# Patient Record
Sex: FEMALE | Race: BLACK OR AFRICAN AMERICAN
[De-identification: names, ages, dates, MRNs, and addresses within clinical notes are randomized per-mention and may not be internally consistent; named-entity substitution may affect disease eponyms.]

---

## 2023-01-14 ENCOUNTER — HOSPITAL ENCOUNTER (INPATIENT)
Dept: HOSPITAL 12 - ER | Age: 43
LOS: 2 days | Discharge: HOME | DRG: 760 | End: 2023-01-16
Payer: COMMERCIAL

## 2023-01-14 VITALS — WEIGHT: 185 LBS | BODY MASS INDEX: 31.58 KG/M2 | HEIGHT: 64 IN

## 2023-01-14 DIAGNOSIS — I10: ICD-10-CM

## 2023-01-14 DIAGNOSIS — Z20.822: ICD-10-CM

## 2023-01-14 DIAGNOSIS — D62: ICD-10-CM

## 2023-01-14 DIAGNOSIS — N93.9: ICD-10-CM

## 2023-01-14 DIAGNOSIS — E83.42: ICD-10-CM

## 2023-01-14 DIAGNOSIS — Z98.51: ICD-10-CM

## 2023-01-14 DIAGNOSIS — E88.09: ICD-10-CM

## 2023-01-14 DIAGNOSIS — E44.0: ICD-10-CM

## 2023-01-14 DIAGNOSIS — D50.9: ICD-10-CM

## 2023-01-14 DIAGNOSIS — D25.9: Primary | ICD-10-CM

## 2023-01-14 DIAGNOSIS — E87.6: ICD-10-CM

## 2023-01-14 DIAGNOSIS — E03.9: ICD-10-CM

## 2023-01-14 LAB
APPEARANCE UR: CLEAR
BILIRUB UR QL STRIP: NEGATIVE
BUN SERPL-MCNC: 7 MG/DL (ref 7–18)
CHLORIDE SERPL-SCNC: 104 MMOL/L (ref 98–107)
CO2 SERPL-SCNC: 29 MMOL/L (ref 21–32)
COLOR UR: YELLOW
CREAT SERPL-MCNC: 0.8 MG/DL (ref 0.6–1.3)
DEPRECATED SQUAMOUS URNS QL MICRO: (no result) /HPF
EOSINOPHIL NFR BLD MANUAL: 3 % (ref 0–8)
GLUCOSE SERPL-MCNC: 100 MG/DL (ref 74–106)
GLUCOSE UR STRIP-MCNC: NEGATIVE MG/DL
HCG UR QL: (no result)
HCT VFR BLD AUTO: 20.9 % (ref 31.2–41.9)
HGB UR QL STRIP: (no result)
KETONES UR STRIP-MCNC: NEGATIVE MG/DL
LEUKOCYTE ESTERASE UR QL STRIP: NEGATIVE
LYMPHOCYTES NFR BLD MANUAL: 23 % (ref 20–40)
MCH RBC QN AUTO: 22.6 UUG (ref 24.7–32.8)
MCV RBC AUTO: 70.7 FL (ref 75.5–95.3)
MONOCYTES NFR BLD MANUAL: 7 % (ref 2–10)
NEUTS SEG NFR BLD MANUAL: 67 % (ref 42–75)
NITRITE UR QL STRIP: NEGATIVE
PH UR STRIP: 7 [PH] (ref 5–8)
PLATELET # BLD AUTO: 355 K/UL (ref 179–408)
POTASSIUM SERPL-SCNC: 2.8 MMOL/L (ref 3.5–5.1)
RBC #/AREA URNS HPF: (no result) /HPF (ref 0–3)
SP GR UR STRIP: 1.01 (ref 1–1.03)
UROBILINOGEN UR STRIP-MCNC: 0.2 E.U./DL
WBC #/AREA URNS HPF: (no result) /HPF
WBC #/AREA URNS HPF: (no result) /HPF (ref 0–3)

## 2023-01-14 PROCEDURE — 30233N1 TRANSFUSION OF NONAUTOLOGOUS RED BLOOD CELLS INTO PERIPHERAL VEIN, PERCUTANEOUS APPROACH: ICD-10-PCS

## 2023-01-14 PROCEDURE — P9016 RBC LEUKOCYTES REDUCED: HCPCS

## 2023-01-14 PROCEDURE — G0378 HOSPITAL OBSERVATION PER HR: HCPCS

## 2023-01-14 PROCEDURE — A4663 DIALYSIS BLOOD PRESSURE CUFF: HCPCS

## 2023-01-14 RX ADMIN — VITAMIN D, TAB 1000IU (100/BT) SCH UNIT: 25 TAB at 18:47

## 2023-01-14 NOTE — NUR
-------------------------------------------------------------------------------

           *** Note luisone in EDM - 01/14/23 at 1939 by CHANTE ***           

-------------------------------------------------------------------------------

Patient sitting up in bed with family member at bedside, updated on plan of 
care at this time. patient made aware she will be admitted to the hospital, 
awaiting room assignment and will be getting a blood transfussion when blood 
becomes available. Patient remains a/o x4, no s/s of any distress noted, denies 
any pain or discomfort at this time. #20g established in right wrist area, 
placed on monitor and will continue to monitor, side rail up.

## 2023-01-14 NOTE — NUR
Resting in bed family member remains at bedside, patient continues to await 
transfer to room. IVF infusing as per order.

## 2023-01-14 NOTE — NUR
Pt will be admitted under the medical care of Dr. Mendoza, level of care = 
telemetry, admission dx of vaginal bleeding and anemia. Room number to follow.

## 2023-01-14 NOTE — NUR
Pt arrived with c/o excessive than normal vaginal bleeding, blood clots, low 
abdominal and low back pain present, rated 5-6/10, characterized as 
aching/cramping, started since Wednesday 

(01-11-23). Pt stated that she has hx of Anemia and HTN but does not medicate 
HTN. 



Seen by Dr. Mckenzie for MSE.

## 2023-01-14 NOTE — NUR
Patient sitting up in bed with family member at bedside, updated on plan of 
care at this time. patient made aware she will be admitted to the hospital, 
awaiting room assignment and will be getting a blood transfusion when blood 
becomes available. Patient remains a/o x4, no s/s of any distress noted, denies 
any pain or discomfort at this time. #20g established in right wrist area, 
placed on monitor and will continue to monitor, side rail up.

## 2023-01-15 VITALS — DIASTOLIC BLOOD PRESSURE: 72 MMHG | SYSTOLIC BLOOD PRESSURE: 135 MMHG

## 2023-01-15 VITALS — SYSTOLIC BLOOD PRESSURE: 141 MMHG | DIASTOLIC BLOOD PRESSURE: 75 MMHG

## 2023-01-15 VITALS — SYSTOLIC BLOOD PRESSURE: 158 MMHG | DIASTOLIC BLOOD PRESSURE: 84 MMHG

## 2023-01-15 VITALS — DIASTOLIC BLOOD PRESSURE: 73 MMHG | SYSTOLIC BLOOD PRESSURE: 141 MMHG

## 2023-01-15 VITALS — SYSTOLIC BLOOD PRESSURE: 163 MMHG | DIASTOLIC BLOOD PRESSURE: 85 MMHG

## 2023-01-15 VITALS — DIASTOLIC BLOOD PRESSURE: 86 MMHG | SYSTOLIC BLOOD PRESSURE: 148 MMHG

## 2023-01-15 LAB
ALP SERPL-CCNC: 63 U/L (ref 50–136)
ALT SERPL W/O P-5'-P-CCNC: 16 U/L (ref 14–59)
AST SERPL-CCNC: 11 U/L (ref 15–37)
BILIRUB SERPL-MCNC: 0.1 MG/DL (ref 0.2–1)
BUN SERPL-MCNC: 7 MG/DL (ref 7–18)
CHLORIDE SERPL-SCNC: 107 MMOL/L (ref 98–107)
CHOLEST SERPL-MCNC: 135 MG/DL (ref ?–200)
CO2 SERPL-SCNC: 29 MMOL/L (ref 21–32)
CREAT SERPL-MCNC: 0.7 MG/DL (ref 0.6–1.3)
GLUCOSE SERPL-MCNC: 92 MG/DL (ref 74–106)
HCT VFR BLD AUTO: 24.2 % (ref 31.2–41.9)
HDLC SERPL-MCNC: 40 MG/DL (ref 40–60)
MAGNESIUM SERPL-MCNC: 2 MG/DL (ref 1.8–2.4)
MCH RBC QN AUTO: 23.5 UUG (ref 24.7–32.8)
MCV RBC AUTO: 74 FL (ref 75.5–95.3)
PHOSPHATE SERPL-MCNC: 3.1 MG/DL (ref 2.5–4.9)
PLATELET # BLD AUTO: 311 K/UL (ref 179–408)
POTASSIUM SERPL-SCNC: 2.8 MMOL/L (ref 3.5–5.1)
TRIGL SERPL-MCNC: 126 MG/DL (ref 30–150)
TSH SERPL DL<=0.005 MIU/L-ACNC: 4.14 MIU/ML (ref 0.36–3.74)
WS STN SPEC: 5.8 G/DL (ref 6.4–8.2)

## 2023-01-15 RX ADMIN — Medication SCH MG: at 08:52

## 2023-01-15 RX ADMIN — PANTOPRAZOLE SODIUM SCH MG: 40 TABLET, DELAYED RELEASE ORAL at 06:51

## 2023-01-15 RX ADMIN — DEXTROSE SCH MLS/HR: 50 INJECTION, SOLUTION INTRAVENOUS at 16:33

## 2023-01-15 RX ADMIN — ACETAMINOPHEN PRN MG: 325 TABLET ORAL at 21:34

## 2023-01-15 RX ADMIN — Medication SCH MG: at 21:34

## 2023-01-15 RX ADMIN — DEXTROSE SCH MLS/HR: 50 INJECTION, SOLUTION INTRAVENOUS at 21:36

## 2023-01-15 RX ADMIN — DEXTROSE SCH MLS/HR: 50 INJECTION, SOLUTION INTRAVENOUS at 23:38

## 2023-01-15 RX ADMIN — DEXTROSE SCH MLS/HR: 50 INJECTION, SOLUTION INTRAVENOUS at 22:38

## 2023-01-15 RX ADMIN — ACETAMINOPHEN PRN MG: 325 TABLET ORAL at 02:28

## 2023-01-15 RX ADMIN — VITAMIN D, TAB 1000IU (100/BT) SCH UNIT: 25 TAB at 08:50

## 2023-01-15 NOTE — NUR
Admitted patient in med surg floor under the care of Dr peña, patient alert oriented, 
complain of headaches, will medicate as ordered. Ambulatory continent of bladder, oriented 
to the room and call lights, cont to monitor.

## 2023-01-15 NOTE — NUR
Patient asleep but arousable, no further complain of headaches, no further vaginal bleeding 
noted, BP wnl now, cont to monitor.

## 2023-01-15 NOTE — NUR
Patient complain of headaches, request tylenol for headaches, noted with slightly elevated 
bp will rechek again, cont to monitor.

## 2023-01-15 NOTE — NUR
Patient was educated and agreed to continue rest of Potassium bags ordered at this time. 
Provided non-pharmacological interventions for prevention of pain. Is in no distress. Safety 
and comfort measures continue to be enforced.

## 2023-01-15 NOTE — NUR
blood infusing as per order, no s/s of reaction noted at this time, patient 
resting remains easy to arouse, will continue to monitor.

## 2023-01-15 NOTE — NUR
POTASSIUM IN PROGRESS AS ORDERED AND AT ABOUT HALF WAY PATIENT C/O HAVING TOO MUCH PAIN AND 
IS UNABLE TO CONTINUE WITH THE REST OF THE INFUSSION SO POTASSIUM STOPPED AND PHARMACY 
NOTIFIED AND PER BHARATHI WILL ADD LIDOCAINE INTO THE MIX TO PREVENT THE PAIN.

## 2023-01-15 NOTE — NUR
SO FAR PATIENT HAS RECEIVED 2 BAGS OF THE POTASSIUM AND ATTEMPTING TO CONTINUE TO INFUSSION 
BUT PATIENT STATED WILL PREFER TO TAKE A BREAK BEFORE CONTINUING WILL WAIT FOR WHEN SHE IS 
READY.WILL CONTINUE TO OBSERVE.

## 2023-01-16 VITALS — DIASTOLIC BLOOD PRESSURE: 76 MMHG | SYSTOLIC BLOOD PRESSURE: 139 MMHG

## 2023-01-16 VITALS — DIASTOLIC BLOOD PRESSURE: 74 MMHG | SYSTOLIC BLOOD PRESSURE: 157 MMHG

## 2023-01-16 LAB
BUN SERPL-MCNC: 10 MG/DL (ref 7–18)
CHLORIDE SERPL-SCNC: 107 MMOL/L (ref 98–107)
CO2 SERPL-SCNC: 28 MMOL/L (ref 21–32)
CREAT SERPL-MCNC: 0.6 MG/DL (ref 0.6–1.3)
GLUCOSE SERPL-MCNC: 93 MG/DL (ref 74–106)
POTASSIUM SERPL-SCNC: 3.4 MMOL/L (ref 3.5–5.1)

## 2023-01-16 RX ADMIN — VITAMIN D, TAB 1000IU (100/BT) SCH UNIT: 25 TAB at 08:50

## 2023-01-16 RX ADMIN — Medication SCH MG: at 08:51

## 2023-01-16 RX ADMIN — PANTOPRAZOLE SODIUM SCH MG: 40 TABLET, DELAYED RELEASE ORAL at 06:30

## 2023-01-16 RX ADMIN — DEXTROSE SCH MLS/HR: 50 INJECTION, SOLUTION INTRAVENOUS at 00:38

## 2023-01-16 NOTE — NUR
Patient completed potassium bag ordered infusion at this time. No adverse reactions noted. 
Was able to tolerate medication. In no signs of distress.

## 2023-01-16 NOTE — NUR
RECEIVED PATIENT IN BED ASLEEP EASILY AROUSABLE ON ROUNDS ALERT AND ORIENTED WHEN AWAKE 
DENIES PAIN OR DISCOMFORTS AT THIS TIME DENIES VAGINAL BLEEDING CALL LIGHTS AND PERSONAL 
BELONGINGS ARE WITHIN EASY REACH AT THIS TIME WILL CONTINUE TO OBSERVE.

## 2023-01-16 NOTE — NUR
PATIENT DISCHARGED PICKED UP BY HER SIGNIFICANT OTHER DENNY IN SATISFACTORY CONDITION WITH 
DISCHARGE INSTRUCTIONS AND ALL HER DISCHARGE MEDICATIONS SENT ELECTRONICALLY PATIENT 
INSTRUCTED TO FOLLOW UP WITH HER OBGYN AS AN OUT PATIENT AND SHE EXPRESSED UNDERSTANDING 
DENIES BLEEDING AT THIS TIME.

## 2023-01-16 NOTE — NUR
Pateint slept intermittently throughout the night. No c/o pain or signs of distress. Safety 
and comfort measures maintained.